# Patient Record
Sex: FEMALE | URBAN - METROPOLITAN AREA
[De-identification: names, ages, dates, MRNs, and addresses within clinical notes are randomized per-mention and may not be internally consistent; named-entity substitution may affect disease eponyms.]

---

## 2019-09-23 ENCOUNTER — APPOINTMENT (RX ONLY)
Dept: URBAN - METROPOLITAN AREA CLINIC 111 | Facility: CLINIC | Age: 34
Setting detail: DERMATOLOGY
End: 2019-09-23

## 2019-09-23 DIAGNOSIS — D22 MELANOCYTIC NEVI: ICD-10-CM

## 2019-09-23 DIAGNOSIS — L91.8 OTHER HYPERTROPHIC DISORDERS OF THE SKIN: ICD-10-CM

## 2019-09-23 DIAGNOSIS — L82.1 OTHER SEBORRHEIC KERATOSIS: ICD-10-CM

## 2019-09-23 DIAGNOSIS — D18.0 HEMANGIOMA: ICD-10-CM

## 2019-09-23 DIAGNOSIS — Z71.89 OTHER SPECIFIED COUNSELING: ICD-10-CM

## 2019-09-23 DIAGNOSIS — L81.4 OTHER MELANIN HYPERPIGMENTATION: ICD-10-CM

## 2019-09-23 PROBLEM — D48.5 NEOPLASM OF UNCERTAIN BEHAVIOR OF SKIN: Status: ACTIVE | Noted: 2019-09-23

## 2019-09-23 PROBLEM — D22.61 MELANOCYTIC NEVI OF RIGHT UPPER LIMB, INCLUDING SHOULDER: Status: ACTIVE | Noted: 2019-09-23

## 2019-09-23 PROBLEM — D18.01 HEMANGIOMA OF SKIN AND SUBCUTANEOUS TISSUE: Status: ACTIVE | Noted: 2019-09-23

## 2019-09-23 PROBLEM — D22.4 MELANOCYTIC NEVI OF SCALP AND NECK: Status: ACTIVE | Noted: 2019-09-23

## 2019-09-23 PROBLEM — D22.71 MELANOCYTIC NEVI OF RIGHT LOWER LIMB, INCLUDING HIP: Status: ACTIVE | Noted: 2019-09-23

## 2019-09-23 PROBLEM — D22.39 MELANOCYTIC NEVI OF OTHER PARTS OF FACE: Status: ACTIVE | Noted: 2019-09-23

## 2019-09-23 PROBLEM — D22.5 MELANOCYTIC NEVI OF TRUNK: Status: ACTIVE | Noted: 2019-09-23

## 2019-09-23 PROBLEM — D22.72 MELANOCYTIC NEVI OF LEFT LOWER LIMB, INCLUDING HIP: Status: ACTIVE | Noted: 2019-09-23

## 2019-09-23 PROBLEM — D39.8 NEOPLASM OF UNCERTAIN BEHAVIOR OF OTHER SPECIFIED FEMALE GENITAL ORGANS: Status: ACTIVE | Noted: 2019-09-23

## 2019-09-23 PROCEDURE — ? COUNSELING

## 2019-09-23 PROCEDURE — ? DIAGNOSIS COMMENT

## 2019-09-23 PROCEDURE — ? BIOPSY BY SHAVE METHOD

## 2019-09-23 PROCEDURE — 99214 OFFICE O/P EST MOD 30 MIN: CPT | Mod: 25

## 2019-09-23 PROCEDURE — ? SUNSCREEN RECOMMENDATIONS

## 2019-09-23 PROCEDURE — 11102 TANGNTL BX SKIN SINGLE LES: CPT | Mod: 59

## 2019-09-23 PROCEDURE — 56605 BIOPSY OF VULVA/PERINEUM: CPT

## 2019-09-23 ASSESSMENT — LOCATION SIMPLE DESCRIPTION DERM
LOCATION SIMPLE: RIGHT UPPER ARM
LOCATION SIMPLE: LEFT BUTTOCK
LOCATION SIMPLE: CHEST
LOCATION SIMPLE: SCALP
LOCATION SIMPLE: ABDOMEN
LOCATION SIMPLE: RIGHT UPPER BACK
LOCATION SIMPLE: RIGHT FOREHEAD
LOCATION SIMPLE: RIGHT SHOULDER
LOCATION SIMPLE: LEFT THIGH
LOCATION SIMPLE: LEFT SHOULDER
LOCATION SIMPLE: LEFT UPPER BACK
LOCATION SIMPLE: RIGHT 2ND TOE
LOCATION SIMPLE: POSTERIOR NECK
LOCATION SIMPLE: RIGHT THIGH
LOCATION SIMPLE: VULVA
LOCATION SIMPLE: RIGHT BUTTOCK
LOCATION SIMPLE: INFERIOR FOREHEAD

## 2019-09-23 ASSESSMENT — LOCATION ZONE DERM
LOCATION ZONE: FACE
LOCATION ZONE: TOE
LOCATION ZONE: SCALP
LOCATION ZONE: ARM
LOCATION ZONE: VULVA
LOCATION ZONE: NECK
LOCATION ZONE: LEG
LOCATION ZONE: TRUNK

## 2019-09-23 ASSESSMENT — LOCATION DETAILED DESCRIPTION DERM
LOCATION DETAILED: RIGHT LABIA MAJORA
LOCATION DETAILED: LEFT BUTTOCK
LOCATION DETAILED: UPPER STERNUM
LOCATION DETAILED: LEFT SUPERIOR PARIETAL SCALP
LOCATION DETAILED: LEFT POSTERIOR SHOULDER
LOCATION DETAILED: LEFT MEDIAL UPPER BACK
LOCATION DETAILED: MIDDLE STERNUM
LOCATION DETAILED: RIGHT ANTERIOR DISTAL THIGH
LOCATION DETAILED: RIGHT FOREHEAD
LOCATION DETAILED: LEFT LATERAL ABDOMEN
LOCATION DETAILED: LEFT ANTERIOR DISTAL THIGH
LOCATION DETAILED: RIGHT ANTERIOR DISTAL UPPER ARM
LOCATION DETAILED: RIGHT ANTERIOR PROXIMAL UPPER ARM
LOCATION DETAILED: RIGHT POSTERIOR SHOULDER
LOCATION DETAILED: LEFT ANTERIOR PROXIMAL THIGH
LOCATION DETAILED: MID POSTERIOR NECK
LOCATION DETAILED: RIGHT BUTTOCK
LOCATION DETAILED: RIGHT DORSAL 2ND TOE
LOCATION DETAILED: RIGHT ANTERIOR PROXIMAL THIGH
LOCATION DETAILED: RIGHT SUPERIOR MEDIAL UPPER BACK
LOCATION DETAILED: INFERIOR MID FOREHEAD

## 2019-09-23 NOTE — PROCEDURE: DIAGNOSIS COMMENT
Comment: Inclusive of lesion of concern.\\n\\nRecommended patient to get HPV Vaccine.
Detail Level: Simple

## 2019-09-23 NOTE — PROCEDURE: BIOPSY BY SHAVE METHOD
Size Of Lesion In Cm: 0.8
Anesthesia Type: 1% lidocaine with epinephrine
Post-Care Instructions: I reviewed with the patient in detail post-care instructions. Patient is to keep the biopsy site dry overnight, and then apply bacitracin twice daily until healed. Patient may apply hydrogen peroxide soaks to remove any crusting.
Lab Facility: [id number]
Cryotherapy Text: The wound bed was treated with cryotherapy after the biopsy was performed.
Notification Instructions: Patient will be notified of biopsy results. However, patient instructed to call the office if not contacted within 2 weeks.
Additional Anesthesia Volume In Cc (Will Not Render If 0): 0
Wound Care: Polysporin ointment
Biopsy Type: H and E
Depth Of Biopsy: dermis
Bill For Surgical Tray: no
Electrodesiccation Text: The wound bed was treated with electrodesiccation after the biopsy was performed.
Consent: Verbal consent was obtained and risks were reviewed including but not limited to scarring, infection, bleeding, scabbing, incomplete removal, nerve damage and allergy to anesthesia.
Dressing: bandage
Electrodesiccation And Curettage Text: The wound bed was treated with electrodesiccation and curettage after the biopsy was performed.
Type Of Destruction Used: Curettage
Anesthesia Volume In Cc (Will Not Render If 0): 0.5
Body Location Override (Optional - Billing Will Still Be Based On Selected Body Map Location If Applicable): left lower abdomen
Silver Nitrate Text: The wound bed was treated with silver nitrate after the biopsy was performed.
Was A Bandage Applied: Yes
Detail Level: Detailed
Biopsy Method: Dermablade
Hemostasis: Drysol
Curettage Text: The wound bed was treated with curettage after the biopsy was performed.
Billing Type: Third-Party Bill
Lab: [id number]
Body Location Override (Optional - Billing Will Still Be Based On Selected Body Map Location If Applicable): lower labia majora
Lab: [id number]
Billing Type: Third-Party Bill
Lab Facility: [id number]

## 2019-10-16 ENCOUNTER — APPOINTMENT (RX ONLY)
Dept: URBAN - METROPOLITAN AREA CLINIC 111 | Facility: CLINIC | Age: 34
Setting detail: DERMATOLOGY
End: 2019-10-16

## 2019-10-16 DIAGNOSIS — A63.0 ANOGENITAL (VENEREAL) WARTS: ICD-10-CM

## 2019-10-16 PROCEDURE — ? COUNSELING

## 2019-10-16 PROCEDURE — 56501 DESTROY VULVA LESIONS SIM: CPT

## 2019-10-16 PROCEDURE — ? LIQUID NITROGEN GENITALS MULTI

## 2019-10-16 ASSESSMENT — LOCATION ZONE DERM: LOCATION ZONE: VULVA

## 2019-10-16 ASSESSMENT — LOCATION DETAILED DESCRIPTION DERM: LOCATION DETAILED: LEFT LABIUM MAJUS

## 2019-10-16 ASSESSMENT — LOCATION SIMPLE DESCRIPTION DERM: LOCATION SIMPLE: LABIA MAJORA

## 2019-10-16 NOTE — PROCEDURE: LIQUID NITROGEN GENITALS MULTI
Total Number Of Lesions Treated: 25
Render Post-Care Instructions In Note?: no
Detail Level: Zone
Consent: The patient's consent was obtained including but not limited to risks of crusting, scabbing, blistering, scarring, darker or lighter pigmentary change, recurrence, incomplete removal and infection.
Post-Care Instructions: I reviewed with the patient in detail post-care instructions. Patient is to wear sunprotection, and avoid picking at any of the treated lesions. Pt may apply Vaseline to crusted or scabbing areas.

## 2019-11-13 ENCOUNTER — APPOINTMENT (RX ONLY)
Dept: URBAN - METROPOLITAN AREA CLINIC 111 | Facility: CLINIC | Age: 34
Setting detail: DERMATOLOGY
End: 2019-11-13

## 2019-11-13 DIAGNOSIS — A63.0 ANOGENITAL (VENEREAL) WARTS: ICD-10-CM

## 2019-11-13 PROCEDURE — 56501 DESTROY VULVA LESIONS SIM: CPT

## 2019-11-13 PROCEDURE — ? LIQUID NITROGEN GENITALS MULTI

## 2019-11-13 PROCEDURE — ? COUNSELING

## 2019-11-13 ASSESSMENT — LOCATION DETAILED DESCRIPTION DERM: LOCATION DETAILED: LEFT LABIUM MAJUS

## 2019-11-13 ASSESSMENT — LOCATION ZONE DERM: LOCATION ZONE: VULVA

## 2019-11-13 ASSESSMENT — LOCATION SIMPLE DESCRIPTION DERM: LOCATION SIMPLE: LABIA MAJORA

## 2019-11-13 NOTE — PROCEDURE: LIQUID NITROGEN GENITALS MULTI
Consent: The patient's consent was obtained including but not limited to risks of crusting, scabbing, blistering, scarring, darker or lighter pigmentary change, recurrence, incomplete removal and infection.
Detail Level: Zone
Render Post-Care Instructions In Note?: no
Total Number Of Lesions Treated: 9
Post-Care Instructions: I reviewed with the patient in detail post-care instructions. Patient is to wear sunprotection, and avoid picking at any of the treated lesions. Pt may apply Vaseline to crusted or scabbing areas.

## 2019-12-11 ENCOUNTER — APPOINTMENT (RX ONLY)
Dept: URBAN - METROPOLITAN AREA CLINIC 111 | Facility: CLINIC | Age: 34
Setting detail: DERMATOLOGY
End: 2019-12-11

## 2019-12-11 DIAGNOSIS — D22 MELANOCYTIC NEVI: ICD-10-CM

## 2019-12-11 DIAGNOSIS — D18.0 HEMANGIOMA: ICD-10-CM

## 2019-12-11 DIAGNOSIS — A63.0 ANOGENITAL (VENEREAL) WARTS: ICD-10-CM

## 2019-12-11 PROBLEM — D18.01 HEMANGIOMA OF SKIN AND SUBCUTANEOUS TISSUE: Status: ACTIVE | Noted: 2019-12-11

## 2019-12-11 PROBLEM — D22.5 MELANOCYTIC NEVI OF TRUNK: Status: ACTIVE | Noted: 2019-12-11

## 2019-12-11 PROCEDURE — ? COUNSELING

## 2019-12-11 PROCEDURE — ? DIAGNOSIS COMMENT

## 2019-12-11 PROCEDURE — 99213 OFFICE O/P EST LOW 20 MIN: CPT

## 2019-12-11 PROCEDURE — ? TREATMENT REGIMEN

## 2019-12-11 ASSESSMENT — LOCATION ZONE DERM
LOCATION ZONE: TRUNK
LOCATION ZONE: VULVA

## 2019-12-11 ASSESSMENT — LOCATION DETAILED DESCRIPTION DERM
LOCATION DETAILED: MONS PUBIS
LOCATION DETAILED: LEFT SUPRAPUBIC SKIN

## 2019-12-11 ASSESSMENT — LOCATION SIMPLE DESCRIPTION DERM: LOCATION SIMPLE: GROIN

## 2019-12-11 NOTE — PROCEDURE: TREATMENT REGIMEN
Detail Level: Zone
Plan: Avoid shaving for the next several months. \\n\\nPatient may trim hair, as desired.

## 2020-08-31 ENCOUNTER — APPOINTMENT (RX ONLY)
Dept: URBAN - METROPOLITAN AREA CLINIC 111 | Facility: CLINIC | Age: 35
Setting detail: DERMATOLOGY
End: 2020-08-31

## 2020-08-31 DIAGNOSIS — L81.4 OTHER MELANIN HYPERPIGMENTATION: ICD-10-CM

## 2020-08-31 DIAGNOSIS — L82.1 OTHER SEBORRHEIC KERATOSIS: ICD-10-CM

## 2020-08-31 DIAGNOSIS — Z71.89 OTHER SPECIFIED COUNSELING: ICD-10-CM

## 2020-08-31 DIAGNOSIS — D18.0 HEMANGIOMA: ICD-10-CM

## 2020-08-31 DIAGNOSIS — D22 MELANOCYTIC NEVI: ICD-10-CM

## 2020-08-31 PROBLEM — D22.4 MELANOCYTIC NEVI OF SCALP AND NECK: Status: ACTIVE | Noted: 2020-08-31

## 2020-08-31 PROBLEM — D18.01 HEMANGIOMA OF SKIN AND SUBCUTANEOUS TISSUE: Status: ACTIVE | Noted: 2020-08-31

## 2020-08-31 PROBLEM — D22.39 MELANOCYTIC NEVI OF OTHER PARTS OF FACE: Status: ACTIVE | Noted: 2020-08-31

## 2020-08-31 PROBLEM — D22.72 MELANOCYTIC NEVI OF LEFT LOWER LIMB, INCLUDING HIP: Status: ACTIVE | Noted: 2020-08-31

## 2020-08-31 PROBLEM — D22.5 MELANOCYTIC NEVI OF TRUNK: Status: ACTIVE | Noted: 2020-08-31

## 2020-08-31 PROBLEM — D22.61 MELANOCYTIC NEVI OF RIGHT UPPER LIMB, INCLUDING SHOULDER: Status: ACTIVE | Noted: 2020-08-31

## 2020-08-31 PROBLEM — D22.71 MELANOCYTIC NEVI OF RIGHT LOWER LIMB, INCLUDING HIP: Status: ACTIVE | Noted: 2020-08-31

## 2020-08-31 PROCEDURE — ? COUNSELING

## 2020-08-31 PROCEDURE — ? SUNSCREEN RECOMMENDATIONS

## 2020-08-31 PROCEDURE — 99213 OFFICE O/P EST LOW 20 MIN: CPT

## 2020-08-31 ASSESSMENT — LOCATION SIMPLE DESCRIPTION DERM
LOCATION SIMPLE: LEFT BUTTOCK
LOCATION SIMPLE: RIGHT 2ND TOE
LOCATION SIMPLE: RIGHT SHOULDER
LOCATION SIMPLE: CHEST
LOCATION SIMPLE: SCALP
LOCATION SIMPLE: ABDOMEN
LOCATION SIMPLE: RIGHT BUTTOCK
LOCATION SIMPLE: RIGHT THIGH
LOCATION SIMPLE: RIGHT FOREHEAD
LOCATION SIMPLE: POSTERIOR NECK
LOCATION SIMPLE: RIGHT UPPER ARM
LOCATION SIMPLE: LEFT SHOULDER
LOCATION SIMPLE: RIGHT UPPER BACK
LOCATION SIMPLE: LEFT THIGH
LOCATION SIMPLE: LEFT UPPER BACK
LOCATION SIMPLE: INFERIOR FOREHEAD

## 2020-08-31 ASSESSMENT — LOCATION DETAILED DESCRIPTION DERM
LOCATION DETAILED: LEFT MEDIAL UPPER BACK
LOCATION DETAILED: RIGHT ANTERIOR DISTAL THIGH
LOCATION DETAILED: RIGHT FOREHEAD
LOCATION DETAILED: INFERIOR MID FOREHEAD
LOCATION DETAILED: LEFT ANTERIOR DISTAL THIGH
LOCATION DETAILED: LEFT BUTTOCK
LOCATION DETAILED: RIGHT ANTERIOR DISTAL UPPER ARM
LOCATION DETAILED: MID POSTERIOR NECK
LOCATION DETAILED: RIGHT SUPERIOR MEDIAL UPPER BACK
LOCATION DETAILED: UPPER STERNUM
LOCATION DETAILED: LEFT POSTERIOR SHOULDER
LOCATION DETAILED: LEFT SUPERIOR PARIETAL SCALP
LOCATION DETAILED: RIGHT ANTERIOR PROXIMAL UPPER ARM
LOCATION DETAILED: RIGHT DORSAL 2ND TOE
LOCATION DETAILED: LEFT ANTERIOR PROXIMAL THIGH
LOCATION DETAILED: RIGHT ANTERIOR PROXIMAL THIGH
LOCATION DETAILED: RIGHT POSTERIOR SHOULDER
LOCATION DETAILED: MIDDLE STERNUM
LOCATION DETAILED: PERIUMBILICAL SKIN
LOCATION DETAILED: RIGHT BUTTOCK

## 2020-08-31 ASSESSMENT — LOCATION ZONE DERM
LOCATION ZONE: SCALP
LOCATION ZONE: FACE
LOCATION ZONE: ARM
LOCATION ZONE: TRUNK
LOCATION ZONE: LEG
LOCATION ZONE: TOE
LOCATION ZONE: NECK

## 2020-08-31 NOTE — HPI: EVALUATION OF SKIN LESION(S)
What Type Of Note Output Would You Prefer (Optional)?: Standard Output
How Severe Are Your Spot(S)?: moderate
Hpi Title: Evaluation of Skin Lesions
Additional History: Last skin exam 08/2019 with JAISON

## 2022-11-29 ENCOUNTER — APPOINTMENT (RX ONLY)
Dept: URBAN - METROPOLITAN AREA CLINIC 111 | Facility: CLINIC | Age: 37
Setting detail: DERMATOLOGY
End: 2022-11-29

## 2022-11-29 DIAGNOSIS — D22 MELANOCYTIC NEVI: ICD-10-CM

## 2022-11-29 DIAGNOSIS — D18.0 HEMANGIOMA: ICD-10-CM

## 2022-11-29 DIAGNOSIS — L91.8 OTHER HYPERTROPHIC DISORDERS OF THE SKIN: ICD-10-CM

## 2022-11-29 DIAGNOSIS — L82.1 OTHER SEBORRHEIC KERATOSIS: ICD-10-CM

## 2022-11-29 DIAGNOSIS — L57.8 OTHER SKIN CHANGES DUE TO CHRONIC EXPOSURE TO NONIONIZING RADIATION: ICD-10-CM

## 2022-11-29 DIAGNOSIS — L81.4 OTHER MELANIN HYPERPIGMENTATION: ICD-10-CM

## 2022-11-29 DIAGNOSIS — Z71.89 OTHER SPECIFIED COUNSELING: ICD-10-CM

## 2022-11-29 PROBLEM — D22.4 MELANOCYTIC NEVI OF SCALP AND NECK: Status: ACTIVE | Noted: 2022-11-29

## 2022-11-29 PROBLEM — D22.61 MELANOCYTIC NEVI OF RIGHT UPPER LIMB, INCLUDING SHOULDER: Status: ACTIVE | Noted: 2022-11-29

## 2022-11-29 PROBLEM — D22.39 MELANOCYTIC NEVI OF OTHER PARTS OF FACE: Status: ACTIVE | Noted: 2022-11-29

## 2022-11-29 PROBLEM — D22.72 MELANOCYTIC NEVI OF LEFT LOWER LIMB, INCLUDING HIP: Status: ACTIVE | Noted: 2022-11-29

## 2022-11-29 PROBLEM — D22.71 MELANOCYTIC NEVI OF RIGHT LOWER LIMB, INCLUDING HIP: Status: ACTIVE | Noted: 2022-11-29

## 2022-11-29 PROBLEM — D22.5 MELANOCYTIC NEVI OF TRUNK: Status: ACTIVE | Noted: 2022-11-29

## 2022-11-29 PROBLEM — D18.01 HEMANGIOMA OF SKIN AND SUBCUTANEOUS TISSUE: Status: ACTIVE | Noted: 2022-11-29

## 2022-11-29 PROCEDURE — ? SUNSCREEN RECOMMENDATIONS

## 2022-11-29 PROCEDURE — 99213 OFFICE O/P EST LOW 20 MIN: CPT

## 2022-11-29 PROCEDURE — ? COUNSELING

## 2022-11-29 PROCEDURE — ? SKIN TAG REMOVAL (COSMETIC)

## 2022-11-29 ASSESSMENT — LOCATION DETAILED DESCRIPTION DERM
LOCATION DETAILED: UPPER STERNUM
LOCATION DETAILED: LEFT AXILLARY VAULT
LOCATION DETAILED: LEFT BUTTOCK
LOCATION DETAILED: INFERIOR MID FOREHEAD
LOCATION DETAILED: LEFT POSTERIOR SHOULDER
LOCATION DETAILED: RIGHT DORSAL 2ND TOE
LOCATION DETAILED: RIGHT ANTERIOR PROXIMAL UPPER ARM
LOCATION DETAILED: PERIUMBILICAL SKIN
LOCATION DETAILED: LEFT MEDIAL UPPER BACK
LOCATION DETAILED: RIGHT AXILLARY VAULT
LOCATION DETAILED: GENITALIA
LOCATION DETAILED: RIGHT ANTERIOR PROXIMAL THIGH
LOCATION DETAILED: RIGHT FOREHEAD
LOCATION DETAILED: LEFT ANTERIOR PROXIMAL THIGH
LOCATION DETAILED: RIGHT POSTERIOR SHOULDER
LOCATION DETAILED: RIGHT BUTTOCK
LOCATION DETAILED: LEFT SUPERIOR PARIETAL SCALP
LOCATION DETAILED: RIGHT ANTERIOR DISTAL THIGH
LOCATION DETAILED: RIGHT SUPERIOR MEDIAL UPPER BACK
LOCATION DETAILED: RIGHT DISTAL DORSAL FOREARM
LOCATION DETAILED: MIDDLE STERNUM
LOCATION DETAILED: LEFT ANTERIOR DISTAL THIGH
LOCATION DETAILED: MID POSTERIOR NECK
LOCATION DETAILED: RIGHT ANTERIOR DISTAL UPPER ARM

## 2022-11-29 ASSESSMENT — LOCATION ZONE DERM
LOCATION ZONE: LEG
LOCATION ZONE: TRUNK
LOCATION ZONE: SCALP
LOCATION ZONE: NECK
LOCATION ZONE: ARM
LOCATION ZONE: AXILLAE
LOCATION ZONE: FACE
LOCATION ZONE: TOE

## 2022-11-29 ASSESSMENT — LOCATION SIMPLE DESCRIPTION DERM
LOCATION SIMPLE: RIGHT AXILLARY VAULT
LOCATION SIMPLE: LEFT BUTTOCK
LOCATION SIMPLE: LEFT AXILLARY VAULT
LOCATION SIMPLE: CHEST
LOCATION SIMPLE: INFERIOR FOREHEAD
LOCATION SIMPLE: RIGHT THIGH
LOCATION SIMPLE: ABDOMEN
LOCATION SIMPLE: LEFT THIGH
LOCATION SIMPLE: GENITALIA
LOCATION SIMPLE: RIGHT SHOULDER
LOCATION SIMPLE: LEFT SHOULDER
LOCATION SIMPLE: RIGHT 2ND TOE
LOCATION SIMPLE: LEFT UPPER BACK
LOCATION SIMPLE: RIGHT BUTTOCK
LOCATION SIMPLE: SCALP
LOCATION SIMPLE: RIGHT FOREHEAD
LOCATION SIMPLE: RIGHT UPPER BACK
LOCATION SIMPLE: RIGHT UPPER ARM
LOCATION SIMPLE: RIGHT FOREARM
LOCATION SIMPLE: POSTERIOR NECK

## 2022-11-29 NOTE — PROCEDURE: SKIN TAG REMOVAL (COSMETIC)
Hemostasis: Drysol
Removed With: scissors
Anesthesia Volume In Cc: 1
Detail Level: Detailed
Price (Use Numbers Only, No Special Characters Or $): 121
Consent: Written consent obtained and the risks of skin tag removal was reviewed with the patient including but not limited to bleeding, pigmentary change, infection, pain, and remote possibility of scarring.
Anesthesia Type: 1% lidocaine with epinephrine and a 1:10 solution of 8.4% sodium bicarbonate

## 2023-05-11 ENCOUNTER — APPOINTMENT (RX ONLY)
Dept: URBAN - METROPOLITAN AREA CLINIC 111 | Facility: CLINIC | Age: 38
Setting detail: DERMATOLOGY
End: 2023-05-11

## 2023-05-11 DIAGNOSIS — L82.1 OTHER SEBORRHEIC KERATOSIS: ICD-10-CM

## 2023-05-11 DIAGNOSIS — L82.0 INFLAMED SEBORRHEIC KERATOSIS: ICD-10-CM

## 2023-05-11 PROCEDURE — ? COUNSELING

## 2023-05-11 PROCEDURE — 99212 OFFICE O/P EST SF 10 MIN: CPT | Mod: 25

## 2023-05-11 PROCEDURE — 17110 DESTRUCTION B9 LES UP TO 14: CPT

## 2023-05-11 PROCEDURE — ? LIQUID NITROGEN

## 2023-05-11 ASSESSMENT — LOCATION SIMPLE DESCRIPTION DERM
LOCATION SIMPLE: RIGHT POSTERIOR UPPER ARM
LOCATION SIMPLE: LEFT THIGH

## 2023-05-11 ASSESSMENT — LOCATION ZONE DERM
LOCATION ZONE: ARM
LOCATION ZONE: LEG

## 2023-05-11 ASSESSMENT — LOCATION DETAILED DESCRIPTION DERM
LOCATION DETAILED: LEFT ANTERIOR PROXIMAL THIGH
LOCATION DETAILED: RIGHT DISTAL POSTERIOR UPPER ARM

## 2023-05-11 NOTE — PROCEDURE: LIQUID NITROGEN
Show Spray Paint Technique Variable?: Yes
Detail Level: Detailed
Medical Necessity Information: It is in your best interest to select a reason for this procedure from the list below. All of these items fulfill various CMS LCD requirements except the new and changing color options.
Add 52 Modifier (Optional): no
Post-Care Instructions: Cryotherapy\\n\\nCryotherapy, using liquid nitrogen, creates a superficial chemical burn. This is used to destroy multiple types of benign and even precancerous lesions on the skin.\\nWhat To Expect\\n    ? Anticipate redness, with itching or burning pain initially. Tylenol (if you are able to take Tylenol) or cold compresses can help with any significant discomfort. \\n    ? Blisters may occur at the treated area(s) over the next few days. The blister may be clear or bloody and may begin to weep or drain. \\n    ? After lesion heals, skin may be slightly darker or lighter at treatment site; this typically fades within a few weeks.\\nCare of Treated Area(s)\\n    ? Wash gently each day, but do not scrub. \\n    ? Apply Vaseline frequently to minimize irritation and reduce scab formation. \\n    ? If the blister is tense and uncomfortable, you may clean with alcohol and puncture with a sterile (cleansed in alcohol) needle. Wear gloves, or wash hands immediately before and after this procedure. \\n    ? If open or draining, you may apply Polysporin antibiotic ointment or Vaseline and a bandage, if needed. \\n    ? Do not pick at or pull off scab. Allow to completely heal.\\nReturn to Clinic\\n    ? If lesion is persistent 3 to 4 weeks after treatment, please return to clinic for additional treatment.\\nNOTE: Cryotherapy may not fully destroy every lesion, every time.  Some lesions may require multiple treatments (which could lead to multiple charges).  Others may require a biopsy if destruction is not effective.
Spray Paint Text: The liquid nitrogen was applied to the skin utilizing a spray paint frosting technique.
Consent: The patient's consent was obtained including but not limited to risks of crusting, scabbing, blistering, scarring, darker or lighter pigmentary change, recurrence, incomplete removal and infection.
Number Of Freeze-Thaw Cycles: 2 freeze-thaw cycles
Medical Necessity Clause: This procedure was medically necessary because the lesions that were treated were:

## 2023-12-14 ENCOUNTER — APPOINTMENT (RX ONLY)
Dept: URBAN - METROPOLITAN AREA CLINIC 111 | Facility: CLINIC | Age: 38
Setting detail: DERMATOLOGY
End: 2023-12-14

## 2023-12-14 DIAGNOSIS — D22 MELANOCYTIC NEVI: ICD-10-CM

## 2023-12-14 DIAGNOSIS — L81.4 OTHER MELANIN HYPERPIGMENTATION: ICD-10-CM

## 2023-12-14 DIAGNOSIS — Z71.89 OTHER SPECIFIED COUNSELING: ICD-10-CM

## 2023-12-14 DIAGNOSIS — L82.1 OTHER SEBORRHEIC KERATOSIS: ICD-10-CM

## 2023-12-14 DIAGNOSIS — L57.8 OTHER SKIN CHANGES DUE TO CHRONIC EXPOSURE TO NONIONIZING RADIATION: ICD-10-CM

## 2023-12-14 DIAGNOSIS — L82.0 INFLAMED SEBORRHEIC KERATOSIS: ICD-10-CM

## 2023-12-14 DIAGNOSIS — D18.0 HEMANGIOMA: ICD-10-CM

## 2023-12-14 PROBLEM — D22.71 MELANOCYTIC NEVI OF RIGHT LOWER LIMB, INCLUDING HIP: Status: ACTIVE | Noted: 2023-12-14

## 2023-12-14 PROBLEM — D22.61 MELANOCYTIC NEVI OF RIGHT UPPER LIMB, INCLUDING SHOULDER: Status: ACTIVE | Noted: 2023-12-14

## 2023-12-14 PROBLEM — D22.72 MELANOCYTIC NEVI OF LEFT LOWER LIMB, INCLUDING HIP: Status: ACTIVE | Noted: 2023-12-14

## 2023-12-14 PROBLEM — D18.01 HEMANGIOMA OF SKIN AND SUBCUTANEOUS TISSUE: Status: ACTIVE | Noted: 2023-12-14

## 2023-12-14 PROBLEM — D22.4 MELANOCYTIC NEVI OF SCALP AND NECK: Status: ACTIVE | Noted: 2023-12-14

## 2023-12-14 PROBLEM — D22.39 MELANOCYTIC NEVI OF OTHER PARTS OF FACE: Status: ACTIVE | Noted: 2023-12-14

## 2023-12-14 PROBLEM — D22.5 MELANOCYTIC NEVI OF TRUNK: Status: ACTIVE | Noted: 2023-12-14

## 2023-12-14 PROCEDURE — ? SUNSCREEN RECOMMENDATIONS

## 2023-12-14 PROCEDURE — 99213 OFFICE O/P EST LOW 20 MIN: CPT

## 2023-12-14 PROCEDURE — ? COUNSELING

## 2023-12-14 ASSESSMENT — LOCATION ZONE DERM
LOCATION ZONE: NECK
LOCATION ZONE: SCALP
LOCATION ZONE: FACE
LOCATION ZONE: LEG
LOCATION ZONE: TRUNK
LOCATION ZONE: TOE
LOCATION ZONE: ARM

## 2023-12-14 ASSESSMENT — LOCATION SIMPLE DESCRIPTION DERM
LOCATION SIMPLE: LEFT BUTTOCK
LOCATION SIMPLE: LEFT UPPER BACK
LOCATION SIMPLE: LEFT PRETIBIAL REGION
LOCATION SIMPLE: ABDOMEN
LOCATION SIMPLE: RIGHT BUTTOCK
LOCATION SIMPLE: LEFT SHOULDER
LOCATION SIMPLE: RIGHT SHOULDER
LOCATION SIMPLE: RIGHT UPPER ARM
LOCATION SIMPLE: POSTERIOR NECK
LOCATION SIMPLE: RIGHT PRETIBIAL REGION
LOCATION SIMPLE: LEFT THIGH
LOCATION SIMPLE: FRONTAL SCALP
LOCATION SIMPLE: CHEST
LOCATION SIMPLE: RIGHT FOREHEAD
LOCATION SIMPLE: RIGHT UPPER BACK
LOCATION SIMPLE: INFERIOR FOREHEAD
LOCATION SIMPLE: LEFT SCALP
LOCATION SIMPLE: RIGHT FOREARM
LOCATION SIMPLE: SCALP
LOCATION SIMPLE: RIGHT THIGH
LOCATION SIMPLE: RIGHT 2ND TOE

## 2023-12-14 ASSESSMENT — LOCATION DETAILED DESCRIPTION DERM
LOCATION DETAILED: INFERIOR MID FOREHEAD
LOCATION DETAILED: LEFT DISTAL PRETIBIAL REGION
LOCATION DETAILED: RIGHT DORSAL 2ND TOE
LOCATION DETAILED: RIGHT DISTAL DORSAL FOREARM
LOCATION DETAILED: RIGHT FOREHEAD
LOCATION DETAILED: LEFT SUPERIOR PARIETAL SCALP
LOCATION DETAILED: LEFT POSTERIOR SHOULDER
LOCATION DETAILED: RIGHT MEDIAL UPPER BACK
LOCATION DETAILED: RIGHT SUPERIOR MEDIAL UPPER BACK
LOCATION DETAILED: RIGHT BUTTOCK
LOCATION DETAILED: RIGHT DISTAL PRETIBIAL REGION
LOCATION DETAILED: RIGHT ANTERIOR DISTAL THIGH
LOCATION DETAILED: RIGHT ANTERIOR PROXIMAL THIGH
LOCATION DETAILED: MEDIAL FRONTAL SCALP
LOCATION DETAILED: MIDDLE STERNUM
LOCATION DETAILED: RIGHT ANTERIOR DISTAL UPPER ARM
LOCATION DETAILED: LEFT ANTERIOR DISTAL THIGH
LOCATION DETAILED: LEFT ANTERIOR PROXIMAL THIGH
LOCATION DETAILED: MID POSTERIOR NECK
LOCATION DETAILED: UPPER STERNUM
LOCATION DETAILED: PERIUMBILICAL SKIN
LOCATION DETAILED: RIGHT POSTERIOR SHOULDER
LOCATION DETAILED: LEFT BUTTOCK
LOCATION DETAILED: RIGHT ANTERIOR PROXIMAL UPPER ARM
LOCATION DETAILED: LEFT MEDIAL UPPER BACK
LOCATION DETAILED: LEFT CENTRAL FRONTAL SCALP

## 2023-12-14 NOTE — HPI: EVALUATION OF SKIN LESION(S)
What Type Of Note Output Would You Prefer (Optional)?: Standard Output
How Severe Are Your Spot(S)?: mild
Hpi Title: Evaluation of Skin Lesions
Additional History: Last fbse Nov 2022. Patient denies any new lesions of concern